# Patient Record
Sex: MALE | Race: BLACK OR AFRICAN AMERICAN | NOT HISPANIC OR LATINO | Employment: UNEMPLOYED | URBAN - METROPOLITAN AREA
[De-identification: names, ages, dates, MRNs, and addresses within clinical notes are randomized per-mention and may not be internally consistent; named-entity substitution may affect disease eponyms.]

---

## 2020-05-18 ENCOUNTER — HOSPITAL ENCOUNTER (EMERGENCY)
Facility: CLINIC | Age: 85
End: 2020-05-18
Payer: MEDICARE

## 2022-12-06 ENCOUNTER — APPOINTMENT (OUTPATIENT)
Dept: GENERAL RADIOLOGY | Facility: CLINIC | Age: 87
End: 2022-12-06
Attending: EMERGENCY MEDICINE
Payer: MEDICARE

## 2022-12-06 ENCOUNTER — HOSPITAL ENCOUNTER (EMERGENCY)
Facility: CLINIC | Age: 87
Discharge: HOME | End: 2022-12-06
Attending: EMERGENCY MEDICINE | Admitting: EMERGENCY MEDICINE
Payer: MEDICARE

## 2022-12-06 VITALS
RESPIRATION RATE: 16 BRPM | HEART RATE: 88 BPM | OXYGEN SATURATION: 100 % | DIASTOLIC BLOOD PRESSURE: 81 MMHG | SYSTOLIC BLOOD PRESSURE: 155 MMHG | TEMPERATURE: 97.9 F

## 2022-12-06 DIAGNOSIS — J10.1 INFLUENZA A: ICD-10-CM

## 2022-12-06 LAB
ANION GAP SERPL CALCULATED.3IONS-SCNC: 4 MMOL/L (ref 3–14)
BASOPHILS # BLD AUTO: 0.1 10E3/UL (ref 0–0.2)
BASOPHILS NFR BLD AUTO: 1 %
BUN SERPL-MCNC: 17 MG/DL (ref 7–30)
CALCIUM SERPL-MCNC: 9.6 MG/DL (ref 8.5–10.1)
CHLORIDE BLD-SCNC: 102 MMOL/L (ref 94–109)
CO2 SERPL-SCNC: 28 MMOL/L (ref 20–32)
CREAT SERPL-MCNC: 1.21 MG/DL (ref 0.66–1.25)
EOSINOPHIL # BLD AUTO: 0.9 10E3/UL (ref 0–0.7)
EOSINOPHIL NFR BLD AUTO: 14 %
ERYTHROCYTE [DISTWIDTH] IN BLOOD BY AUTOMATED COUNT: 14.2 % (ref 10–15)
FLUAV RNA SPEC QL NAA+PROBE: POSITIVE
FLUBV RNA RESP QL NAA+PROBE: NEGATIVE
GFR SERPL CREATININE-BSD FRML MDRD: 56 ML/MIN/1.73M2
GLUCOSE BLD-MCNC: 109 MG/DL (ref 70–99)
HCT VFR BLD AUTO: 39.6 % (ref 40–53)
HGB BLD-MCNC: 13 G/DL (ref 13.3–17.7)
IMM GRANULOCYTES # BLD: 0 10E3/UL
IMM GRANULOCYTES NFR BLD: 0 %
LACTATE SERPL-SCNC: 1.4 MMOL/L (ref 0.7–2)
LYMPHOCYTES # BLD AUTO: 1.5 10E3/UL (ref 0.8–5.3)
LYMPHOCYTES NFR BLD AUTO: 23 %
MCH RBC QN AUTO: 26.9 PG (ref 26.5–33)
MCHC RBC AUTO-ENTMCNC: 32.8 G/DL (ref 31.5–36.5)
MCV RBC AUTO: 82 FL (ref 78–100)
MONOCYTES # BLD AUTO: 0.4 10E3/UL (ref 0–1.3)
MONOCYTES NFR BLD AUTO: 6 %
NEUTROPHILS # BLD AUTO: 3.7 10E3/UL (ref 1.6–8.3)
NEUTROPHILS NFR BLD AUTO: 56 %
NRBC # BLD AUTO: 0 10E3/UL
NRBC BLD AUTO-RTO: 0 /100
PLATELET # BLD AUTO: 184 10E3/UL (ref 150–450)
POTASSIUM BLD-SCNC: 4.3 MMOL/L (ref 3.4–5.3)
PROCALCITONIN SERPL-MCNC: <0.05 NG/ML
RBC # BLD AUTO: 4.84 10E6/UL (ref 4.4–5.9)
RSV RNA SPEC NAA+PROBE: NEGATIVE
SARS-COV-2 RNA RESP QL NAA+PROBE: NEGATIVE
SODIUM SERPL-SCNC: 134 MMOL/L (ref 133–144)
WBC # BLD AUTO: 6.5 10E3/UL (ref 4–11)

## 2022-12-06 PROCEDURE — 83605 ASSAY OF LACTIC ACID: CPT | Performed by: EMERGENCY MEDICINE

## 2022-12-06 PROCEDURE — 85025 COMPLETE CBC W/AUTO DIFF WBC: CPT | Performed by: EMERGENCY MEDICINE

## 2022-12-06 PROCEDURE — 84145 PROCALCITONIN (PCT): CPT | Performed by: EMERGENCY MEDICINE

## 2022-12-06 PROCEDURE — 71046 X-RAY EXAM CHEST 2 VIEWS: CPT

## 2022-12-06 PROCEDURE — 250N000013 HC RX MED GY IP 250 OP 250 PS 637: Performed by: EMERGENCY MEDICINE

## 2022-12-06 PROCEDURE — 999N000127 HC STATISTIC PERIPHERAL IV START W US GUIDANCE

## 2022-12-06 PROCEDURE — 99284 EMERGENCY DEPT VISIT MOD MDM: CPT | Mod: CS | Performed by: EMERGENCY MEDICINE

## 2022-12-06 PROCEDURE — 80048 BASIC METABOLIC PNL TOTAL CA: CPT | Performed by: EMERGENCY MEDICINE

## 2022-12-06 PROCEDURE — 99284 EMERGENCY DEPT VISIT MOD MDM: CPT | Mod: CS,25 | Performed by: EMERGENCY MEDICINE

## 2022-12-06 PROCEDURE — 258N000003 HC RX IP 258 OP 636: Performed by: EMERGENCY MEDICINE

## 2022-12-06 PROCEDURE — 36415 COLL VENOUS BLD VENIPUNCTURE: CPT | Performed by: EMERGENCY MEDICINE

## 2022-12-06 PROCEDURE — C9803 HOPD COVID-19 SPEC COLLECT: HCPCS | Performed by: EMERGENCY MEDICINE

## 2022-12-06 PROCEDURE — 87637 SARSCOV2&INF A&B&RSV AMP PRB: CPT | Performed by: EMERGENCY MEDICINE

## 2022-12-06 RX ORDER — ACETAMINOPHEN 500 MG
500 TABLET ORAL ONCE
Status: COMPLETED | OUTPATIENT
Start: 2022-12-06 | End: 2022-12-06

## 2022-12-06 RX ORDER — OSELTAMIVIR PHOSPHATE 75 MG/1
75 CAPSULE ORAL ONCE
Status: COMPLETED | OUTPATIENT
Start: 2022-12-06 | End: 2022-12-06

## 2022-12-06 RX ORDER — OSELTAMIVIR PHOSPHATE 75 MG/1
75 CAPSULE ORAL 2 TIMES DAILY
Qty: 10 CAPSULE | Refills: 0 | Status: SHIPPED | OUTPATIENT
Start: 2022-12-06 | End: 2022-12-11

## 2022-12-06 RX ADMIN — ACETAMINOPHEN 500 MG: 500 TABLET ORAL at 09:23

## 2022-12-06 RX ADMIN — SODIUM CHLORIDE 500 ML: 9 INJECTION, SOLUTION INTRAVENOUS at 12:27

## 2022-12-06 RX ADMIN — OSELTAMIVIR PHOSPHATE 75 MG: 75 CAPSULE ORAL at 09:23

## 2022-12-06 ASSESSMENT — ACTIVITIES OF DAILY LIVING (ADL)
ADLS_ACUITY_SCORE: 35

## 2022-12-06 NOTE — ED TRIAGE NOTES
Triage Assessment     Row Name 12/06/22 0743       Triage Assessment (Adult)    Airway WDL WDL       Respiratory WDL    Respiratory WDL X;cough    Cough Frequency frequent    Cough Type dry       Skin Circulation/Temperature WDL    Skin Circulation/Temperature WDL WDL       Cardiac WDL    Cardiac WDL WDL       Peripheral/Neurovascular WDL    Peripheral Neurovascular WDL WDL       Cognitive/Neuro/Behavioral WDL    Cognitive/Neuro/Behavioral WDL WDL

## 2022-12-08 ASSESSMENT — ENCOUNTER SYMPTOMS
ABDOMINAL PAIN: 0
SHORTNESS OF BREATH: 0
FEVER: 0

## 2022-12-08 NOTE — ED PROVIDER NOTES
ED Provider Note  [facility]      History     Chief Complaint   Patient presents with     Flu Symptoms     Body aches, fevers, cough     HPI  Iván Hernandez is a 89 year old male who comes in with family with complaints of generalized myalgias, fevers and dry cough for the last 24 hours.  Patient also reports generalized weakness and mildly decreased appetite.  He denies any chest pain or shortness of breath, no abdominal pain no urinary frequency or dysuria.  Family reports sick contacts in the household.    Past Medical History  Past Medical History:   Diagnosis Date     Hypertension goal BP (blood pressure) < 140/90 10/28/2014     History reviewed. No pertinent surgical history.  oseltamivir (TAMIFLU) 75 MG capsule  atenolol (TENORMIN) 50 MG tablet  hydrochlorothiazide (HYDRODIURIL) 50 MG tablet      Allergies   Allergen Reactions     Lisinopril      Cough and headache      Family History  No family history on file.  Social History   Social History     Tobacco Use     Smoking status: Never   Substance Use Topics     Alcohol use: No     Drug use: No      Past medical history, past surgical history, medications, allergies, family history, and social history were reviewed with the patient. No additional pertinent items.       Review of Systems   Constitutional: Negative for fever.   Respiratory: Negative for shortness of breath.    Cardiovascular: Negative for chest pain.   Gastrointestinal: Negative for abdominal pain.   All other systems reviewed and are negative.    A complete review of systems was performed with pertinent positives and negatives noted in the HPI, and all other systems negative.    Physical Exam   BP: (!) 145/69  Pulse: 75  Temp: 97.9  F (36.6  C)  Resp: 16  SpO2: 98 %  Physical Exam  Vitals and nursing note reviewed.   Constitutional:       General: He is not in acute distress.     Appearance: Normal appearance. He is not diaphoretic.   HENT:      Head:  Atraumatic.      Mouth/Throat:      Mouth: Oropharynx is clear and moist.   Eyes:      General: No scleral icterus.     Pupils: Pupils are equal, round, and reactive to light.   Cardiovascular:      Rate and Rhythm: Normal rate and regular rhythm.      Pulses: Intact distal pulses.      Heart sounds: Normal heart sounds.   Pulmonary:      Effort: No respiratory distress.      Breath sounds: Normal breath sounds.   Abdominal:      General: Bowel sounds are normal.      Palpations: Abdomen is soft.      Tenderness: There is no abdominal tenderness.   Musculoskeletal:         General: No tenderness or edema.   Skin:     General: Skin is warm.      Findings: No rash.   Neurological:      General: No focal deficit present.      Mental Status: He is alert and oriented to person, place, and time.   Psychiatric:         Mood and Affect: Mood normal.         Behavior: Behavior normal.           ED Course      Procedures                   Results for orders placed or performed during the hospital encounter of 12/06/22   XR Chest 2 Views     Status: None    Narrative    CHEST 2 VIEWS   12/6/2022 9:41 AM     HISTORY: Cough (flu+).    COMPARISON: CT chest 10/13/2008.      Impression    IMPRESSION: Ill-defined bibasilar opacities. Pneumonia is a  possibility. Stable mildly enlarged cardiac silhouette.    SHRUTI RAGSDALE MD         SYSTEM ID:  [id number]   Symptomatic Influenza A/B & SARS-CoV2 (COVID-19) Virus PCR Multiplex Nasopharyngeal     Status: Abnormal    Specimen: Nasopharyngeal; Swab   Result Value Ref Range    Influenza A PCR Positive (A) Negative    Influenza B PCR Negative Negative    RSV PCR Negative Negative    SARS CoV2 PCR Negative Negative    Narrative    Testing was performed using the Xpert Xpress CoV2/Flu/RSV Assay on the [employer] GeneXpert Instrument. This test should be ordered for the detection of SARS-CoV-2 and influenza viruses in individuals who meet clinical and/or epidemiological criteria. Test performance is  unknown in asymptomatic patients. This test is for in vitro diagnostic use under the FDA EUA for laboratories certified under CLIA to perform high or moderate complexity testing. This test has not been FDA cleared or approved. A negative result does not rule out the presence of PCR inhibitors in the specimen or target RNA in concentration below the limit of detection for the assay. If only one viral target is positive but coinfection with multiple targets is suspected, the sample should be re-tested with another FDA cleared, approved, or authorized test, if coinfection would change clinical management. This test was validated by the [email or web address]. These laboratories are certified under the Clinical Laboratory Improvement Amendments of 1988 (CLIA-88) as qualified to perform high complexity laboratory testing.   Basic metabolic panel     Status: Abnormal   Result Value Ref Range    Sodium 134 133 - 144 mmol/L    Potassium 4.3 3.4 - 5.3 mmol/L    Chloride 102 94 - 109 mmol/L    Carbon Dioxide (CO2) 28 20 - 32 mmol/L    Anion Gap 4 3 - 14 mmol/L    Urea Nitrogen 17 7 - 30 mg/dL    Creatinine 1.21 0.66 - 1.25 mg/dL    Calcium 9.6 8.5 - 10.1 mg/dL    Glucose 109 (H) 70 - 99 mg/dL    GFR Estimate 56 (L) >60 mL/min/1.73m2   Lactic acid whole blood     Status: Normal   Result Value Ref Range    Lactic Acid 1.4 0.7 - 2.0 mmol/L   Procalcitonin     Status: Normal   Result Value Ref Range    Procalcitonin <0.05 <0.05 ng/mL   CBC with platelets and differential     Status: Abnormal   Result Value Ref Range    WBC Count 6.5 4.0 - 11.0 10e3/uL    RBC Count 4.84 4.40 - 5.90 10e6/uL    Hemoglobin 13.0 (L) 13.3 - 17.7 g/dL    Hematocrit 39.6 (L) 40.0 - 53.0 %    MCV 82 78 - 100 fL    MCH 26.9 26.5 - 33.0 pg    MCHC 32.8 31.5 - 36.5 g/dL    RDW 14.2 10.0 - 15.0 %    Platelet Count 184 150 - 450 10e3/uL    % Neutrophils 56 %    % Lymphocytes 23 %    % Monocytes 6 %    % Eosinophils 14 %    % Basophils 1 %    % Immature  Granulocytes 0 %    NRBCs per 100 WBC 0 <1 /100    Absolute Neutrophils 3.7 1.6 - 8.3 10e3/uL    Absolute Lymphocytes 1.5 0.8 - 5.3 10e3/uL    Absolute Monocytes 0.4 0.0 - 1.3 10e3/uL    Absolute Eosinophils 0.9 (H) 0.0 - 0.7 10e3/uL    Absolute Basophils 0.1 0.0 - 0.2 10e3/uL    Absolute Immature Granulocytes 0.0 <=0.4 10e3/uL    Absolute NRBCs 0.0 10e3/uL   CBC with platelets differential     Status: Abnormal    Narrative    The following orders were created for panel order CBC with platelets differential.  Procedure                               Abnormality         Status                     ---------                               -----------         ------                     CBC with platelets and d...[[id number]]  Abnormal            Final result                 Please view results for these tests on the individual orders.     Medications   acetaminophen (TYLENOL) tablet 500 mg (500 mg Oral Given 12/6/22 0923)   oseltamivir (TAMIFLU) capsule 75 mg (75 mg Oral Given 12/6/22 0923)   0.9% sodium chloride BOLUS (0 mLs Intravenous Stopped 12/6/22 1230)        Assessments & Plan (with Medical Decision Making)     89 year old male who comes in with family with complaints of generalized myalgias, fevers and dry cough for the last 24 hours.  Vital signs stable and afebrile in triage including normal pulse ox and 98% on room air.  IV established, labs drawn sent reviewed document epic remarkable for positive influenza A swab but otherwise normal CBC electrolytes.  Procalcitonin undetectable.  Chest x-ray was obtained interpreted by me which revealed bibasilar opacities consistent with viral infection.  Patient given 1 L IV fluid bolus here in the department along with 500 mg of acetaminophen and Tamiflu here in Emergency Department and prescription.  Patient tolerating p.o. without any difficulty here in the emergency department.  Plan for discharge home with prescription for Tamiflu and follow-up with primary care  provider within 5 days for further evaluation and care.  Patient family expressed verbal standing and anticipatory guidance return precautions to the emergency department.    I have reviewed the nursing notes. I have reviewed the findings, diagnosis, plan and need for follow up with the patient.    Discharge Medication List as of 12/6/2022  2:15 PM      START taking these medications    Details   oseltamivir (TAMIFLU) 75 MG capsule Take 1 capsule (75 mg) by mouth 2 times daily for 5 days, Disp-10 capsule, R-0, Local Print             Final diagnoses:   Influenza A       --  Kimberley Mcmillan MD  [facility] EMERGENCY DEPARTMENT  12/6/2022     Kimberley Mcmillan MD  12/08/22 0050

## 2024-01-01 ENCOUNTER — APPOINTMENT (OUTPATIENT)
Dept: GENERAL RADIOLOGY | Facility: CLINIC | Age: 89
End: 2024-01-01
Attending: FAMILY MEDICINE
Payer: COMMERCIAL

## 2024-01-01 ENCOUNTER — TRANSITIONAL CARE UNIT VISIT (OUTPATIENT)
Dept: GERIATRICS | Facility: CLINIC | Age: 89
End: 2024-01-01
Payer: COMMERCIAL

## 2024-01-01 ENCOUNTER — DOCUMENTATION ONLY (OUTPATIENT)
Dept: GERIATRICS | Facility: CLINIC | Age: 89
End: 2024-01-01
Payer: COMMERCIAL

## 2024-01-01 ENCOUNTER — APPOINTMENT (OUTPATIENT)
Dept: CT IMAGING | Facility: CLINIC | Age: 89
End: 2024-01-01
Attending: FAMILY MEDICINE
Payer: COMMERCIAL

## 2024-01-01 ENCOUNTER — TELEPHONE (OUTPATIENT)
Dept: GERIATRICS | Facility: CLINIC | Age: 89
End: 2024-01-01
Payer: COMMERCIAL

## 2024-01-01 ENCOUNTER — HOSPITAL ENCOUNTER (EMERGENCY)
Facility: CLINIC | Age: 89
Discharge: HOME | End: 2024-05-27
Attending: FAMILY MEDICINE | Admitting: FAMILY MEDICINE
Payer: COMMERCIAL

## 2024-01-01 VITALS
DIASTOLIC BLOOD PRESSURE: 67 MMHG | OXYGEN SATURATION: 99 % | HEIGHT: 72 IN | HEART RATE: 80 BPM | SYSTOLIC BLOOD PRESSURE: 114 MMHG | WEIGHT: 173 LBS | RESPIRATION RATE: 17 BRPM | BODY MASS INDEX: 23.43 KG/M2 | TEMPERATURE: 98.5 F

## 2024-01-01 VITALS
RESPIRATION RATE: 18 BRPM | HEART RATE: 99 BPM | OXYGEN SATURATION: 94 % | DIASTOLIC BLOOD PRESSURE: 70 MMHG | SYSTOLIC BLOOD PRESSURE: 130 MMHG | BODY MASS INDEX: 23.06 KG/M2 | TEMPERATURE: 98.2 F | WEIGHT: 170 LBS

## 2024-01-01 DIAGNOSIS — N12 PYELONEPHRITIS: ICD-10-CM

## 2024-01-01 DIAGNOSIS — I50.31 ACUTE DIASTOLIC HEART FAILURE (H): ICD-10-CM

## 2024-01-01 DIAGNOSIS — I69.954 HEMIPLEGIA AND HEMIPARESIS FOLLOWING UNSPECIFIED CEREBROVASCULAR DISEASE AFFECTING LEFT NON-DOMINANT SIDE (H): ICD-10-CM

## 2024-01-01 DIAGNOSIS — G40.909 SEIZURE DISORDER (H): ICD-10-CM

## 2024-01-01 DIAGNOSIS — R10.32 ABDOMINAL PAIN, LEFT LOWER QUADRANT: ICD-10-CM

## 2024-01-01 DIAGNOSIS — R53.81 PHYSICAL DECONDITIONING: ICD-10-CM

## 2024-01-01 DIAGNOSIS — I42.9 CARDIOMYOPATHY, UNSPECIFIED TYPE (H): ICD-10-CM

## 2024-01-01 DIAGNOSIS — K59.00 CONSTIPATION, UNSPECIFIED CONSTIPATION TYPE: ICD-10-CM

## 2024-01-01 DIAGNOSIS — J18.9 COMMUNITY ACQUIRED PNEUMONIA, UNSPECIFIED LATERALITY: Primary | ICD-10-CM

## 2024-01-01 DIAGNOSIS — N18.30 STAGE 3 CHRONIC KIDNEY DISEASE, UNSPECIFIED WHETHER STAGE 3A OR 3B CKD (H): ICD-10-CM

## 2024-01-01 DIAGNOSIS — I21.4 NSTEMI (NON-ST ELEVATED MYOCARDIAL INFARCTION) (H): ICD-10-CM

## 2024-01-01 LAB
ALBUMIN SERPL BCG-MCNC: 3.4 G/DL (ref 3.5–5.2)
ALBUMIN UR-MCNC: 30 MG/DL
ALP SERPL-CCNC: 77 U/L (ref 40–150)
ALT SERPL W P-5'-P-CCNC: 9 U/L (ref 0–70)
ANION GAP SERPL CALCULATED.3IONS-SCNC: 11 MMOL/L (ref 7–15)
APPEARANCE UR: CLEAR
AST SERPL W P-5'-P-CCNC: 16 U/L (ref 0–45)
ATRIAL RATE - MUSE: 76 BPM
BASOPHILS # BLD AUTO: 0.1 10E3/UL (ref 0–0.2)
BASOPHILS NFR BLD AUTO: 1 %
BILIRUB SERPL-MCNC: 0.4 MG/DL
BILIRUB UR QL STRIP: NEGATIVE
BUN SERPL-MCNC: 25.1 MG/DL (ref 8–23)
CALCIUM SERPL-MCNC: 9.5 MG/DL (ref 8.2–9.6)
CHLORIDE SERPL-SCNC: 100 MMOL/L (ref 98–107)
COLOR UR AUTO: ABNORMAL
CREAT SERPL-MCNC: 1.54 MG/DL (ref 0.67–1.17)
DEPRECATED HCO3 PLAS-SCNC: 23 MMOL/L (ref 22–29)
DIASTOLIC BLOOD PRESSURE - MUSE: NORMAL MMHG
EGFRCR SERPLBLD CKD-EPI 2021: 42 ML/MIN/1.73M2
EOSINOPHIL # BLD AUTO: 0.6 10E3/UL (ref 0–0.7)
EOSINOPHIL NFR BLD AUTO: 9 %
ERYTHROCYTE [DISTWIDTH] IN BLOOD BY AUTOMATED COUNT: 14.1 % (ref 10–15)
GLUCOSE SERPL-MCNC: 145 MG/DL (ref 70–99)
GLUCOSE UR STRIP-MCNC: NEGATIVE MG/DL
HCT VFR BLD AUTO: 32.8 % (ref 40–53)
HGB BLD-MCNC: 10.8 G/DL (ref 13.3–17.7)
HGB UR QL STRIP: NEGATIVE
IMM GRANULOCYTES # BLD: 0 10E3/UL
IMM GRANULOCYTES NFR BLD: 1 %
INTERPRETATION ECG - MUSE: NORMAL
KETONES UR STRIP-MCNC: NEGATIVE MG/DL
LEUKOCYTE ESTERASE UR QL STRIP: NEGATIVE
LIPASE SERPL-CCNC: 26 U/L (ref 13–60)
LYMPHOCYTES # BLD AUTO: 1.1 10E3/UL (ref 0.8–5.3)
LYMPHOCYTES NFR BLD AUTO: 17 %
MAGNESIUM SERPL-MCNC: 2 MG/DL (ref 1.7–2.3)
MCH RBC QN AUTO: 26.8 PG (ref 26.5–33)
MCHC RBC AUTO-ENTMCNC: 32.9 G/DL (ref 31.5–36.5)
MCV RBC AUTO: 81 FL (ref 78–100)
MONOCYTES # BLD AUTO: 0.3 10E3/UL (ref 0–1.3)
MONOCYTES NFR BLD AUTO: 5 %
MUCOUS THREADS #/AREA URNS LPF: PRESENT /LPF
NEUTROPHILS # BLD AUTO: 4.1 10E3/UL (ref 1.6–8.3)
NEUTROPHILS NFR BLD AUTO: 67 %
NITRATE UR QL: NEGATIVE
NRBC # BLD AUTO: 0 10E3/UL
NRBC BLD AUTO-RTO: 0 /100
P AXIS - MUSE: 41 DEGREES
PH UR STRIP: 6 [PH] (ref 5–7)
PLATELET # BLD AUTO: 291 10E3/UL (ref 150–450)
POTASSIUM SERPL-SCNC: 4.1 MMOL/L (ref 3.4–5.3)
PR INTERVAL - MUSE: 240 MS
PROT SERPL-MCNC: 6.6 G/DL (ref 6.4–8.3)
QRS DURATION - MUSE: 148 MS
QT - MUSE: 420 MS
QTC - MUSE: 472 MS
R AXIS - MUSE: 86 DEGREES
RBC # BLD AUTO: 4.03 10E6/UL (ref 4.4–5.9)
RBC URINE: 2 /HPF
SODIUM SERPL-SCNC: 134 MMOL/L (ref 135–145)
SP GR UR STRIP: 1.02 (ref 1–1.03)
SQUAMOUS EPITHELIAL: <1 /HPF
SYSTOLIC BLOOD PRESSURE - MUSE: NORMAL MMHG
T AXIS - MUSE: -13 DEGREES
TROPONIN T SERPL HS-MCNC: 39 NG/L
TROPONIN T SERPL HS-MCNC: 42 NG/L
TSH SERPL DL<=0.005 MIU/L-ACNC: 1.87 UIU/ML (ref 0.3–4.2)
UROBILINOGEN UR STRIP-MCNC: NORMAL MG/DL
VENTRICULAR RATE- MUSE: 76 BPM
WBC # BLD AUTO: 6.1 10E3/UL (ref 4–11)
WBC URINE: 1 /HPF

## 2024-01-01 PROCEDURE — 74176 CT ABD & PELVIS W/O CONTRAST: CPT

## 2024-01-01 PROCEDURE — 80053 COMPREHEN METABOLIC PANEL: CPT | Performed by: FAMILY MEDICINE

## 2024-01-01 PROCEDURE — 83735 ASSAY OF MAGNESIUM: CPT | Performed by: FAMILY MEDICINE

## 2024-01-01 PROCEDURE — 81001 URINALYSIS AUTO W/SCOPE: CPT | Performed by: FAMILY MEDICINE

## 2024-01-01 PROCEDURE — 93005 ELECTROCARDIOGRAM TRACING: CPT | Performed by: FAMILY MEDICINE

## 2024-01-01 PROCEDURE — 84443 ASSAY THYROID STIM HORMONE: CPT | Performed by: FAMILY MEDICINE

## 2024-01-01 PROCEDURE — 84484 ASSAY OF TROPONIN QUANT: CPT | Performed by: FAMILY MEDICINE

## 2024-01-01 PROCEDURE — 85004 AUTOMATED DIFF WBC COUNT: CPT | Performed by: FAMILY MEDICINE

## 2024-01-01 PROCEDURE — 99285 EMERGENCY DEPT VISIT HI MDM: CPT | Mod: 25 | Performed by: FAMILY MEDICINE

## 2024-01-01 PROCEDURE — 83690 ASSAY OF LIPASE: CPT | Performed by: FAMILY MEDICINE

## 2024-01-01 PROCEDURE — 93010 ELECTROCARDIOGRAM REPORT: CPT | Performed by: FAMILY MEDICINE

## 2024-01-01 PROCEDURE — 36415 COLL VENOUS BLD VENIPUNCTURE: CPT | Performed by: FAMILY MEDICINE

## 2024-01-01 PROCEDURE — 99309 SBSQ NF CARE MODERATE MDM 30: CPT | Performed by: NURSE PRACTITIONER

## 2024-01-01 PROCEDURE — 71046 X-RAY EXAM CHEST 2 VIEWS: CPT

## 2024-01-01 PROCEDURE — 99285 EMERGENCY DEPT VISIT HI MDM: CPT | Performed by: FAMILY MEDICINE

## 2024-01-01 RX ORDER — LEVETIRACETAM 250 MG/1
500 TABLET ORAL 2 TIMES DAILY
COMMUNITY

## 2024-01-01 RX ORDER — FELODIPINE 5 MG/1
5 TABLET, EXTENDED RELEASE ORAL DAILY
COMMUNITY

## 2024-01-01 RX ORDER — BISACODYL 10 MG
10 SUPPOSITORY, RECTAL RECTAL DAILY PRN
Qty: 2 SUPPOSITORY | Refills: 0 | Status: SHIPPED | OUTPATIENT
Start: 2024-01-01 | End: 2024-01-01

## 2024-01-01 RX ORDER — ASPIRIN 81 MG/1
TABLET ORAL
COMMUNITY

## 2024-01-01 RX ORDER — FINASTERIDE 5 MG/1
5 TABLET, FILM COATED ORAL DAILY
COMMUNITY

## 2024-01-01 RX ORDER — ACETAMINOPHEN 500 MG
1000 TABLET ORAL 3 TIMES DAILY PRN
COMMUNITY

## 2024-01-01 RX ORDER — SENNOSIDES 8.6 MG
1 TABLET ORAL 2 TIMES DAILY PRN
COMMUNITY

## 2024-01-01 RX ORDER — ATORVASTATIN CALCIUM 20 MG/1
20 TABLET, FILM COATED ORAL DAILY
COMMUNITY

## 2024-01-01 RX ORDER — AMOXICILLIN 250 MG
1 CAPSULE ORAL 2 TIMES DAILY PRN
Qty: 30 TABLET | Refills: 0 | Status: SHIPPED | OUTPATIENT
Start: 2024-01-01 | End: 2024-01-01

## 2024-01-01 RX ORDER — TAMSULOSIN HYDROCHLORIDE 0.4 MG/1
0.4 CAPSULE ORAL DAILY
COMMUNITY

## 2024-01-01 RX ORDER — POLYETHYLENE GLYCOL 3350 17 G/17G
17 POWDER, FOR SOLUTION ORAL DAILY
COMMUNITY

## 2024-01-01 RX ORDER — CEFUROXIME AXETIL 500 MG/1
500 TABLET ORAL 2 TIMES DAILY
COMMUNITY

## 2024-01-01 ASSESSMENT — ACTIVITIES OF DAILY LIVING (ADL)
ADLS_ACUITY_SCORE: 35

## 2024-01-01 ASSESSMENT — COLUMBIA-SUICIDE SEVERITY RATING SCALE - C-SSRS
6. HAVE YOU EVER DONE ANYTHING, STARTED TO DO ANYTHING, OR PREPARED TO DO ANYTHING TO END YOUR LIFE?: NO
1. IN THE PAST MONTH, HAVE YOU WISHED YOU WERE DEAD OR WISHED YOU COULD GO TO SLEEP AND NOT WAKE UP?: NO
2. HAVE YOU ACTUALLY HAD ANY THOUGHTS OF KILLING YOURSELF IN THE PAST MONTH?: NO

## 2024-05-27 NOTE — DISCHARGE INSTRUCTIONS
Thank you for choosing [facility].     Please closely monitor for further symptoms. Return to the Emergency Department if you develop any new or worsening signs or symptoms.    If you received any opiate pain medications or sedatives during your visit, please do not drive for at least 8 hours.     Labs, cultures or final xray interpretations may still need to be reviewed.  We will call you if your plan of care needs to be changed.    Drink more water.  Today your kidney function was very slightly down when compared to previous which may be related to dehydration, aging, or medications.  Please follow up with your primary care physician or clinic in the next 7 to 10 days to recheck and discuss any further intervention.

## 2024-05-27 NOTE — ED TRIAGE NOTES
Pt presents to the ED for concerns of generalized weakness and his whole body hurting. Pt states this has been going on for the past 3-4 days. Pt denies any chest pain, shortness of breath. Son, does state pt has had a cough, intermittently.      Triage Assessment (Adult)       Row Name 05/27/24 0735          Triage Assessment    Airway WDL WDL        Respiratory WDL    Respiratory WDL WDL        Skin Circulation/Temperature WDL    Skin Circulation/Temperature WDL WDL        Cardiac WDL    Cardiac WDL WDL        Peripheral/Neurovascular WDL    Peripheral Neurovascular WDL WDL        Cognitive/Neuro/Behavioral WDL    Cognitive/Neuro/Behavioral WDL WDL

## 2024-05-27 NOTE — ED PROVIDER NOTES
ED Provider Note  [facility]      History     Chief Complaint   Patient presents with    Generalized Body Aches     Ongoing for the past 3-4 days     Generalized Weakness     HPI  Iván Hernandez is a 89 year old male who has history of CVA, seizure disorder, hypertension, benign prostatic hypertrophy, hyperlipidemia, aortic stenosis, who is presenting complaining of generalized bodyaches, no bowel movement for 6 days, lower abdominal pain, increased difficulty voiding including hesitancy and frequency but no dysuria.  Denies fever, chills, chest pain or shortness of breath.  He has chronic lower extremity edema which he states is at baseline.  Has not suffered any trauma.  He has no neurologic symptoms.    Past Medical History  Past Medical History:   Diagnosis Date    Hypertension goal BP (blood pressure) < 140/90 10/28/2014     History reviewed. No pertinent surgical history.  bisacodyl (DULCOLAX) 10 MG suppository  magnesium hydroxide (MILK OF MAGNESIA) 400 MG/5ML suspension  senna-docusate (SENOKOT-S/PERICOLACE) 8.6-50 MG tablet  aspirin 81 MG EC tablet  atenolol (TENORMIN) 50 MG tablet  hydrochlorothiazide (HYDRODIURIL) 50 MG tablet  levETIRAcetam (KEPPRA) 250 MG tablet      Allergies   Allergen Reactions    Lisinopril      Cough and headache      Family History  History reviewed. No pertinent family history.  Social History   Social History     Tobacco Use    Smoking status: Never   Substance Use Topics    Alcohol use: No    Drug use: No      A medically appropriate review of systems was performed with pertinent positives and negatives noted in the HPI, and all other systems negative.    Physical Exam   BP: 112/66  Pulse: 82  Temp: 98.5  F (36.9  C)  Resp: 16  Height: 182.9 cm (6')  Weight: 78.5 kg (173 lb)  SpO2: 98 %  Physical Exam  Vitals and nursing note reviewed.   Constitutional:       General: He is not in acute distress.     Appearance: Normal appearance. He is not  toxic-appearing.   HENT:      Head: Atraumatic.   Eyes:      General: No scleral icterus.     Conjunctiva/sclera: Conjunctivae normal.   Cardiovascular:      Rate and Rhythm: Normal rate and regular rhythm.      Heart sounds: Murmur (Loud murmur over the aortic site) heard.   Pulmonary:      Effort: Pulmonary effort is normal. No respiratory distress.      Breath sounds: Normal breath sounds.   Abdominal:      Palpations: Abdomen is soft.      Tenderness: There is no abdominal tenderness. There is no left CVA tenderness, guarding or rebound.   Genitourinary:     Penis: Normal.       Testes: Normal.   Musculoskeletal:         General: No deformity.      Cervical back: Neck supple.      Right lower leg: Edema present.      Left lower leg: Edema (1+ bilateral symmetric nontender edema) present.   Skin:     General: Skin is warm.   Neurological:      General: No focal deficit present.      Mental Status: He is alert and oriented to person, place, and time.           ED Course, Procedures, & Data      Procedures            EKG Interpretation:      Interpreted by Varnu Umaña MD  Time reviewed: 0804  Symptoms at time of EKG: Generalized myalgias0   Rhythm: normal sinus   Rate: Normal  Axis: Normal  Ectopy: none  Conduction: right bundle branch block (complete) and 1st degree AV block  ST Segments/ T Waves: T wave inversion Lateral and Inferior  Q Waves: nonspecific  Comparison to prior: Unchanged from January 2021    Clinical Impression: Normal sinus rhythm with first-degree AV block, right bundle branch block, inferolateral T wave abnormality but no acute changes when compared to January 2021                  Results for orders placed or performed during the hospital encounter of 05/27/24   Comprehensive metabolic panel     Status: Abnormal   Result Value Ref Range    Sodium 134 (L) 135 - 145 mmol/L    Potassium 4.1 3.4 - 5.3 mmol/L    Carbon Dioxide (CO2) 23 22 - 29 mmol/L    Anion Gap 11 7 - 15 mmol/L    Urea  Nitrogen 25.1 (H) 8.0 - 23.0 mg/dL    Creatinine 1.54 (H) 0.67 - 1.17 mg/dL    GFR Estimate 42 (L) >60 mL/min/1.73m2    Calcium 9.5 8.2 - 9.6 mg/dL    Chloride 100 98 - 107 mmol/L    Glucose 145 (H) 70 - 99 mg/dL    Alkaline Phosphatase 77 40 - 150 U/L    AST 16 0 - 45 U/L    ALT 9 0 - 70 U/L    Protein Total 6.6 6.4 - 8.3 g/dL    Albumin 3.4 (L) 3.5 - 5.2 g/dL    Bilirubin Total 0.4 <=1.2 mg/dL   Magnesium     Status: Normal   Result Value Ref Range    Magnesium 2.0 1.7 - 2.3 mg/dL   TSH with free T4 reflex     Status: Normal   Result Value Ref Range    TSH 1.87 0.30 - 4.20 uIU/mL   UA with Microscopic reflex to Culture     Status: Abnormal    Specimen: Urine, Midstream   Result Value Ref Range    Color Urine Light Yellow Colorless, Straw, Light Yellow, Yellow    Appearance Urine Clear Clear    Glucose Urine Negative Negative mg/dL    Bilirubin Urine Negative Negative    Ketones Urine Negative Negative mg/dL    Specific Gravity Urine 1.016 1.003 - 1.035    Blood Urine Negative Negative    pH Urine 6.0 5.0 - 7.0    Protein Albumin Urine 30 (A) Negative mg/dL    Urobilinogen Urine Normal Normal, 2.0 mg/dL    Nitrite Urine Negative Negative    Leukocyte Esterase Urine Negative Negative    Mucus Urine Present (A) None Seen /LPF    RBC Urine 2 <=2 /HPF    WBC Urine 1 <=5 /HPF    Squamous Epithelials Urine <1 <=1 /HPF    Narrative    Urine Culture not indicated   Lipase     Status: Normal   Result Value Ref Range    Lipase 26 13 - 60 U/L   Troponin T, High Sensitivity     Status: Abnormal   Result Value Ref Range    Troponin T, High Sensitivity 42 (H) <=22 ng/L   CBC with platelets and differential     Status: Abnormal   Result Value Ref Range    WBC Count 6.1 4.0 - 11.0 10e3/uL    RBC Count 4.03 (L) 4.40 - 5.90 10e6/uL    Hemoglobin 10.8 (L) 13.3 - 17.7 g/dL    Hematocrit 32.8 (L) 40.0 - 53.0 %    MCV 81 78 - 100 fL    MCH 26.8 26.5 - 33.0 pg    MCHC 32.9 31.5 - 36.5 g/dL    RDW 14.1 10.0 - 15.0 %    Platelet Count 291  150 - 450 10e3/uL    % Neutrophils 67 %    % Lymphocytes 17 %    % Monocytes 5 %    % Eosinophils 9 %    % Basophils 1 %    % Immature Granulocytes 1 %    NRBCs per 100 WBC 0 <1 /100    Absolute Neutrophils 4.1 1.6 - 8.3 10e3/uL    Absolute Lymphocytes 1.1 0.8 - 5.3 10e3/uL    Absolute Monocytes 0.3 0.0 - 1.3 10e3/uL    Absolute Eosinophils 0.6 0.0 - 0.7 10e3/uL    Absolute Basophils 0.1 0.0 - 0.2 10e3/uL    Absolute Immature Granulocytes 0.0 <=0.4 10e3/uL    Absolute NRBCs 0.0 10e3/uL   Troponin T, High Sensitivity     Status: Abnormal   Result Value Ref Range    Troponin T, High Sensitivity 39 (H) <=22 ng/L   EKG 12-lead, tracing only     Status: None   Result Value Ref Range    Systolic Blood Pressure  mmHg    Diastolic Blood Pressure  mmHg    Ventricular Rate 76 BPM    Atrial Rate 76 BPM    WY Interval 240 ms    QRS Duration 148 ms     ms    QTc 472 ms    P Axis 41 degrees    R AXIS 86 degrees    T Axis -13 degrees    Interpretation ECG       Sinus rhythm with 1st degree A-V block  Right bundle branch block  T wave abnormality, consider inferolateral ischemia  Abnormal ECG  Unconfirmed report - interpretation of this ECG is computer generated - see medical record for final interpretation  Confirmed by - EMERGENCY ROOM, PHYSICIAN ([id number]),  VAMSI BAUER ([id number]) on 5/27/2024 8:08:34 AM     CBC with platelets differential     Status: Abnormal    Narrative    The following orders were created for panel order CBC with platelets differential.  Procedure                               Abnormality         Status                     ---------                               -----------         ------                     CBC with platelets and d...[[id number]]  Abnormal            Final result                 Please view results for these tests on the individual orders.     Medications - No data to display  Labs Ordered and Resulted from Time of ED Arrival to Time of ED Departure   COMPREHENSIVE METABOLIC PANEL  - Abnormal       Result Value    Sodium 134 (*)     Potassium 4.1      Carbon Dioxide (CO2) 23      Anion Gap 11      Urea Nitrogen 25.1 (*)     Creatinine 1.54 (*)     GFR Estimate 42 (*)     Calcium 9.5      Chloride 100      Glucose 145 (*)     Alkaline Phosphatase 77      AST 16      ALT 9      Protein Total 6.6      Albumin 3.4 (*)     Bilirubin Total 0.4     ROUTINE UA WITH MICROSCOPIC REFLEX TO CULTURE - Abnormal    Color Urine Light Yellow      Appearance Urine Clear      Glucose Urine Negative      Bilirubin Urine Negative      Ketones Urine Negative      Specific Gravity Urine 1.016      Blood Urine Negative      pH Urine 6.0      Protein Albumin Urine 30 (*)     Urobilinogen Urine Normal      Nitrite Urine Negative      Leukocyte Esterase Urine Negative      Mucus Urine Present (*)     RBC Urine 2      WBC Urine 1      Squamous Epithelials Urine <1     TROPONIN T, HIGH SENSITIVITY - Abnormal    Troponin T, High Sensitivity 42 (*)    CBC WITH PLATELETS AND DIFFERENTIAL - Abnormal    WBC Count 6.1      RBC Count 4.03 (*)     Hemoglobin 10.8 (*)     Hematocrit 32.8 (*)     MCV 81      MCH 26.8      MCHC 32.9      RDW 14.1      Platelet Count 291      % Neutrophils 67      % Lymphocytes 17      % Monocytes 5      % Eosinophils 9      % Basophils 1      % Immature Granulocytes 1      NRBCs per 100 WBC 0      Absolute Neutrophils 4.1      Absolute Lymphocytes 1.1      Absolute Monocytes 0.3      Absolute Eosinophils 0.6      Absolute Basophils 0.1      Absolute Immature Granulocytes 0.0      Absolute NRBCs 0.0     TROPONIN T, HIGH SENSITIVITY - Abnormal    Troponin T, High Sensitivity 39 (*)    MAGNESIUM - Normal    Magnesium 2.0     TSH WITH FREE T4 REFLEX - Normal    TSH 1.87     LIPASE - Normal    Lipase 26       CT Abdomen Pelvis w/o Contrast    (Results Pending)   XR Chest 2 Views    (Results Pending)          Critical care was not performed.     Medical Decision Making  The patient's presentation was of  moderate complexity (an acute illness with systemic symptoms).    The patient's evaluation involved:  review of external note(s) from 2 sources (reviewed notes from prior clinic visit at [facility], and prior admit for extensive medical workup in January 2021)  review of 2 test result(s) ordered prior to this encounter (prior EKG, prior echo results, prior renal function)  ordering and/or review of 3+ test(s) in this encounter (see separate area of note for details)  independent interpretation of testing performed by another health professional (CT abdomen pelvis and chest x-ray images personally reviewed and reviewed radiologist interpretation)    The patient's management necessitated moderate risk (prescription drug management including medications given in the ED) and high risk (a decision regarding hospitalization).    Assessment & Plan    89-year-old male with a history of multiple medical problems including benign prostatic hypertrophy and aortic stenosis who presented with 6 days of diffuse myalgias and weakness, left lower quadrant abdominal pain and decreased bowel movements as well as increased prostate symptoms.  Broad differential diagnosis includes multiple potential causes of left lower quadrant pain associated with myalgias, constipation and urinary symptoms including diverticulitis, AAA, colitis, pancreatitis, small bowel obstruction, neoplasm, UTI, pyelonephritis, ureterolithiasis, prostatitis.  On exam patient has normal vital signs and does not appear to be in any acute distress.  He has a loud murmur systolic obvious at the aortic area consistent with his known history of aortic stenosis.  His cardiovascular exam is otherwise normal.  His lungs are clear he is in no respiratory distress.  Abdomen is moderate tenderness in the left lower quadrant without guarding rebound or peritoneal signs.  His genitourinary exam is normal, he refuses a rectal exam due to cultural concerns.  He has 1+ bilateral  lower extremity edema, nontender which both he and his son tells me is chronic.  EKG without acute changes.  He does have elevation of his troponin however on recheck no change, review of chart has had a persistently elevated troponin in the past.  His symptoms are not consistent with an acute coronary syndrome and I suspect elevated troponin is related to his history of aortic stenosis, mild CHF, and chronic kidney disease.  His GFR is slightly decreased from multiple prior checks, his other labs unremarkable.  Chest x-ray shows atelectasis versus mild pulmonary congestion, compared to previous chest x-ray I do not think represents a significant acute change.  Abdominal CT positive only for large fecal burden and chronic findings.  I offered again to perform a rectal, consider an enema.  The patient refuses.  He will only accept treatment for complex constipation at home.  We discussed his renal function, this is likely acute on chronic related to mild volume depletion.  He prefers to take oral fluids.  Based on the clinical findings and the entire clinical scenario, the patient appears stable at this time to be treated symptomatically, and to follow-up as an outpatient for any further evaluation and treatment.  Discussed expected course, need for follow up, and indications for return with the patient.  See discharge instructions.      I have reviewed the nursing notes. I have reviewed the findings, diagnosis, plan and need for follow up with the patient.    Discharge Medication List as of 5/27/2024 11:23 AM        START taking these medications    Details   bisacodyl (DULCOLAX) 10 MG suppository Place 1 suppository (10 mg) rectally daily as needed for constipation, Disp-2 suppository, R-0, Local Print      magnesium hydroxide (MILK OF MAGNESIA) 400 MG/5ML suspension Take 15 mLs by mouth daily as needed for constipation, Disp-354 mL, R-0, Local Print      senna-docusate (SENOKOT-S/PERICOLACE) 8.6-50 MG tablet Take  1 tablet by mouth 2 times daily as needed for constipation, Disp-30 tablet, R-0, Local Print             Final diagnoses:   Abdominal pain, left lower quadrant   Constipation, unspecified constipation type   Stage 3 chronic kidney disease, unspecified whether stage 3a or 3b CKD (H)       Varun Umaña MD  [facility] EMERGENCY DEPARTMENT  5/27/2024     Varun Umaña MD  05/27/24 0379

## 2024-05-27 NOTE — ED NOTES
Patient does not want to have rectal exam or enema. Provider spoke to patient and son about going home with laxative.

## 2024-06-17 PROBLEM — I63.9 ACUTE CVA (CEREBROVASCULAR ACCIDENT) (H): Status: ACTIVE | Noted: 2019-08-10

## 2024-06-17 PROBLEM — I69.954 HEMIPLEGIA AND HEMIPARESIS FOLLOWING UNSPECIFIED CEREBROVASCULAR DISEASE AFFECTING LEFT NON-DOMINANT SIDE (H): Status: ACTIVE | Noted: 2020-08-06

## 2024-06-17 PROBLEM — N40.1 BENIGN NON-NODULAR PROSTATIC HYPERPLASIA WITH LOWER URINARY TRACT SYMPTOMS: Status: ACTIVE | Noted: 2017-08-01

## 2024-06-17 PROBLEM — J18.9 COMMUNITY ACQUIRED PNEUMONIA OF RIGHT LOWER LOBE OF LUNG: Status: ACTIVE | Noted: 2024-01-01

## 2024-06-17 PROBLEM — N17.9 AKI (ACUTE KIDNEY INJURY) (H): Status: ACTIVE | Noted: 2019-08-10

## 2024-06-17 PROBLEM — I35.0 AORTIC STENOSIS, MODERATE: Status: ACTIVE | Noted: 2024-01-01

## 2024-06-17 PROBLEM — R53.1 WEAKNESS: Status: ACTIVE | Noted: 2024-01-01

## 2024-06-17 PROBLEM — H25.13 NUCLEAR SENILE CATARACT OF BOTH EYES: Status: ACTIVE | Noted: 2020-06-15

## 2024-06-17 PROBLEM — I42.9 CARDIOMYOPATHY (H): Status: ACTIVE | Noted: 2024-01-01

## 2024-06-17 PROBLEM — K59.00 CONSTIPATION: Status: ACTIVE | Noted: 2024-01-01

## 2024-06-17 PROBLEM — I77.810 MILD DILATION OF ASCENDING AORTA (H): Status: ACTIVE | Noted: 2017-03-13

## 2024-06-17 PROBLEM — E78.5 HYPERLIPIDEMIA: Status: ACTIVE | Noted: 2018-07-22

## 2024-06-17 PROBLEM — R79.89 ELEVATED TROPONIN LEVEL: Status: ACTIVE | Noted: 2024-01-01

## 2024-06-17 PROBLEM — R56.9 GENERALIZED SEIZURE (H): Status: ACTIVE | Noted: 2018-07-09

## 2024-06-17 PROBLEM — D50.9 MICROCYTIC ANEMIA: Status: ACTIVE | Noted: 2024-01-01

## 2024-06-17 PROBLEM — I21.4 NSTEMI (NON-ST ELEVATED MYOCARDIAL INFARCTION) (H): Status: ACTIVE | Noted: 2021-01-05

## 2024-06-17 PROBLEM — S01.512A TONGUE LACERATION: Status: ACTIVE | Noted: 2018-07-09

## 2024-06-17 NOTE — LETTER
6/17/2024      Iván Hernandez  [address]  [facility] [address]        [facility] GERIATR[facility]    PRIMARY CARE PROVIDER AND CLINIC:  [facility] / [facility] [address]  Chief Complaint   Patient presents with     Hospital F/U      [facility] Medical Record Number:  [id number]  Place of Service where encounter took place:  [facility] (TCU) [[id number]]    Iván Hernandez  is a 89 year old  (1/1/1930) male with a medical history of cerebellar stroke in 2020, seizure disorder and hypertension.  He currently lives at home with check and support from his son and daughter.  He was having progressive weakness and allover pain with cough therefore he presented to the [facility] emergency room on 6/11/2024.  He was found to have hyponatremia.  CT abd/pelvis showed RLL pneumonia, bilateral pleural effusions and focal pyelonephritis in the left kidney. He was started on azithromycin and ceftriaxone for pneumonia. EKG showed inferior wall ischemia and troponin was elevated. Echo showed new decrease in EF 35-40%, given IV lasix for mild CHF exacerbation. Cardiology felt the elevated troponin may be from acute coronary syndrome versus stress cardiomyopathy. Due to patient's age and comorbidities, it was felt that coronary angiogram was not urgently needed at this time, especially with no active chest pain.  He was admitted to the above facility from  [facility] . Hospital stay 6/11/2024 through 6/15/2024..     Iván was visited today while in his room resting in bed, son is present for the visit and able to interpret.  Son reports that his dad has been very anxious as of late as he feels that something is going to happen to him therefore he would like family members to stay overnight with him.  Last night he did have some episodes of shortness of breath and oxygen was applied.  Saturations have remained stable in the low to mid 90s.  He  does have a cough which has been present for quite some time.  He also has lower abdominal pain which she has had for some time as well.  Son reports that it has been a while since Iván has had a bowel movement.  He has not noted lower extremity edema.  He reports pain all over but cannot describe where it is.  His appetite is decreased but he does like chicken noodle soup which he ate well last night.  He has had urinary frequency for many years.  He is on tamsulosin but son reports that this has not been helpful and they would like to consider finasteride as this impairs his quality of life to frequently urinate.    CODE STATUS/ADVANCE DIRECTIVES DISCUSSION:  No Order  CPR/Full code   ALLERGIES:   Allergies   Allergen Reactions     Lisinopril Cough     Cough and headache      PAST MEDICAL HISTORY:   Past Medical History:   Diagnosis Date     Hypertension goal BP (blood pressure) < 140/90 10/28/2014      PAST SURGICAL HISTORY:   has no past surgical history on file.  FAMILY HISTORY: family history is not on file.  SOCIAL HISTORY:   reports that he has never smoked. He does not have any smokeless tobacco history on file. He reports that he does not drink alcohol and does not use drugs.  Patient's living condition: lives alone    Post Discharge Medication Reconciliation Status:   MED REC REQUIRED  Post Medication Reconciliation Status: discharge medications reconciled, continue medications without change       Current Outpatient Medications   Medication Sig Dispense Refill     acetaminophen (TYLENOL) 500 MG tablet Take 1,000 mg by mouth 3 times daily as needed for mild pain       aspirin 81 MG EC tablet 1 tablet Orally Once a day for 90 days       atorvastatin (LIPITOR) 20 MG tablet Take 20 mg by mouth daily       cefuroxime (CEFTIN) 500 MG tablet Take 500 mg by mouth 2 times daily       felodipine ER (PLENDIL) 5 MG 24 hr tablet Take 5 mg by mouth daily       finasteride (PROSCAR) 5 MG tablet Take 5 mg by mouth  daily       levETIRAcetam (KEPPRA) 250 MG tablet Take 500 mg by mouth 2 times daily       linaclotide (LINZESS) 145 MCG capsule Take 145 mcg by mouth every morning (before breakfast)       polyethylene glycol (MIRALAX) 17 g packet Take 17 g by mouth daily       sennosides (SENOKOT) 8.6 MG tablet Take 1 tablet by mouth 2 times daily as needed for constipation       tamsulosin (FLOMAX) 0.4 MG capsule Take 0.4 mg by mouth daily       No current facility-administered medications for this visit.       ROS:  10 point ROS of systems including Constitutional, Eyes, Respiratory, Cardiovascular, Gastroenterology, Genitourinary, Integumentary, Musculoskeletal, Psychiatric were all negative except for pertinent positives noted in my HPI.    Vitals:  /70   Pulse 99   Temp 98.2  F (36.8  C)   Resp 18   Wt 77.1 kg (170 lb)   SpO2 94%   BMI 23.06 kg/m    Exam:  GENERAL APPEARANCE:  Alert, in no distress, pleasant, cooperative, oriented x 4  EYES: no discharge or mattering on lids or lashes noted  ENT:  moist mucous membranes, hearing acuity intact  NECK: supple, symmetrical  RESP: no respiratory distress, Lung sounds clear but diminished in the right lower lobe, patient is on room air  CV:  rate and rhythm regular, no murmur. Edema none in bilateral lower extremities. VASCULAR: warm extremities without open areas.  ABDOMEN: normal bowel sounds, soft, nontender.  M/S:   Gait and station: Unsafe without assistance, no tenderness or swelling of the joints; able to move all extremities   SKIN:  Inspection and palpation of skin and subcutaneous tissue: skin warm, dry and intact without rashes  NEURO: no facial asymmetry, no speech deficits and able to follow directions, moves all extremities symmetrically  PSYCH:  insight and judgement intact, memory intact, affect and mood normal    Lab/Diagnostic data:  Recent labs in [facility] reviewed by me today.     ASSESSMENT/PLAN:  Community-acquired pneumonia.  Bilateral pleural  effusions.  Was treated with IV azithromycin and ceftriaxone while inpatient and changed to Ceftin for discharge.  His right lower lobe is diminished otherwise lungs are clear.  He has been afebrile.  Required oxygen therapy overnight and is currently on 2 L nasal cannula.  --Continue with Ceftin 500 mg twice daily with last dose 6/17/2024  -- Continue with oxygen therapy to keep saturations over 90%.  If becomes more hypoxic will obtain repeat chest x-ray.    NSTEMI  CHF  thought that it was demand ischemia from illness versus cardiac event.  Now with new heart failure with Echo showed EF 35-40%.  Was not discharged with the diuretic but he does not appear fluid overload on exam.  Systolic blood pressures have been in the 120s.  -- Continue to monitor fluid status and blood pressures.  -- Continue with felodipine 5 mg daily  -- Aspirin 81 mg daily    Pyelonephritis   Treated with Ceftin.  Has urinary frequency but this is likely due to BPH as it has been present for many years per the son's report.  --Continue with tamsulosin 0.4 mg daily  -- Start finasteride 5 mg daily in the morning    History of R cerebellar stroke   in 2020.residual left-sided weakness, ambulates with a walker and lives independently at home.  -- continue aspirin/atorvastatin.    Seizure disorder.  Diagnosed in 2018, on keppra.  --Continue with Keppra 500 mg twice daily    Physical deconditioning  Secondary to recent hospitalization and underlying medical conditions.   --ongoing PT/OT for strengthening.       Electronically signed by:  FAUZIA Jackson CNP                   Sincerely,        FAUZIA Jackson CNP

## 2024-06-17 NOTE — PROGRESS NOTES
[facility] GERIATRICS    PRIMARY CARE PROVIDER AND CLINIC:  [facility], [address] / [facility] [address]  Chief Complaint   Patient presents with    Hospital F/U      [facility] Medical Record Number:  [id number]  Place of Service where encounter took place:  [facility] [[id number]]    Iván Hernandez  is a 89 year old  (1/1/1930) male with a medical history of cerebellar stroke in 2020, seizure disorder and hypertension.  He currently lives at home with check and support from his son and daughter.  He was having progressive weakness and allover pain with cough therefore he presented to the [facility] emergency room on 6/11/2024.  He was found to have hyponatremia.  CT abd/pelvis showed RLL pneumonia, bilateral pleural effusions and focal pyelonephritis in the left kidney. He was started on azithromycin and ceftriaxone for pneumonia. EKG showed inferior wall ischemia and troponin was elevated. Echo showed new decrease in EF 35-40%, given IV lasix for mild CHF exacerbation. Cardiology felt the elevated troponin may be from acute coronary syndrome versus stress cardiomyopathy. Due to patient's age and comorbidities, it was felt that coronary angiogram was not urgently needed at this time, especially with no active chest pain.  He was admitted to the above facility from  [facility] . Hospital stay 6/11/2024 through 6/15/2024..     Iván was visited today while in his room resting in bed, son is present for the visit and able to interpret.  Son reports that his dad has been very anxious as of late as he feels that something is going to happen to him therefore he would like family members to stay overnight with him.  Last night he did have some episodes of shortness of breath and oxygen was applied.  Saturations have remained stable in the low to mid 90s.  He does have a cough which has been present for quite some time.  He also has lower  abdominal pain which she has had for some time as well.  Son reports that it has been a while since Iván has had a bowel movement.  He has not noted lower extremity edema.  He reports pain all over but cannot describe where it is.  His appetite is decreased but he does like chicken noodle soup which he ate well last night.  He has had urinary frequency for many years.  He is on tamsulosin but son reports that this has not been helpful and they would like to consider finasteride as this impairs his quality of life to frequently urinate.    CODE STATUS/ADVANCE DIRECTIVES DISCUSSION:  No Order  CPR/Full code   ALLERGIES:   Allergies   Allergen Reactions    Lisinopril Cough     Cough and headache      PAST MEDICAL HISTORY:   Past Medical History:   Diagnosis Date    Hypertension goal BP (blood pressure) < 140/90 10/28/2014      PAST SURGICAL HISTORY:   has no past surgical history on file.  FAMILY HISTORY: family history is not on file.  SOCIAL HISTORY:   reports that he has never smoked. He does not have any smokeless tobacco history on file. He reports that he does not drink alcohol and does not use drugs.  Patient's living condition: lives alone    Post Discharge Medication Reconciliation Status:   MED REC REQUIRED  Post Medication Reconciliation Status: discharge medications reconciled, continue medications without change       Current Outpatient Medications   Medication Sig Dispense Refill    acetaminophen (TYLENOL) 500 MG tablet Take 1,000 mg by mouth 3 times daily as needed for mild pain      aspirin 81 MG EC tablet 1 tablet Orally Once a day for 90 days      atorvastatin (LIPITOR) 20 MG tablet Take 20 mg by mouth daily      cefuroxime (CEFTIN) 500 MG tablet Take 500 mg by mouth 2 times daily      felodipine ER (PLENDIL) 5 MG 24 hr tablet Take 5 mg by mouth daily      finasteride (PROSCAR) 5 MG tablet Take 5 mg by mouth daily      levETIRAcetam (KEPPRA) 250 MG tablet Take 500 mg by mouth 2 times daily       linaclotide (LINZESS) 145 MCG capsule Take 145 mcg by mouth every morning (before breakfast)      polyethylene glycol (MIRALAX) 17 g packet Take 17 g by mouth daily      sennosides (SENOKOT) 8.6 MG tablet Take 1 tablet by mouth 2 times daily as needed for constipation      tamsulosin (FLOMAX) 0.4 MG capsule Take 0.4 mg by mouth daily       No current facility-administered medications for this visit.       ROS:  10 point ROS of systems including Constitutional, Eyes, Respiratory, Cardiovascular, Gastroenterology, Genitourinary, Integumentary, Musculoskeletal, Psychiatric were all negative except for pertinent positives noted in my HPI.    Vitals:  /70   Pulse 99   Temp 98.2  F (36.8  C)   Resp 18   Wt 77.1 kg (170 lb)   SpO2 94%   BMI 23.06 kg/m    Exam:  GENERAL APPEARANCE:  Alert, in no distress, pleasant, cooperative, oriented x 4  EYES: no discharge or mattering on lids or lashes noted  ENT:  moist mucous membranes, hearing acuity intact  NECK: supple, symmetrical  RESP: no respiratory distress, Lung sounds clear but diminished in the right lower lobe, patient is on room air  CV:  rate and rhythm regular, no murmur. Edema none in bilateral lower extremities. VASCULAR: warm extremities without open areas.  ABDOMEN: normal bowel sounds, soft, nontender.  M/S:   Gait and station: Unsafe without assistance, no tenderness or swelling of the joints; able to move all extremities   SKIN:  Inspection and palpation of skin and subcutaneous tissue: skin warm, dry and intact without rashes  NEURO: no facial asymmetry, no speech deficits and able to follow directions, moves all extremities symmetrically  PSYCH:  insight and judgement intact, memory intact, affect and mood normal    Lab/Diagnostic data:  Recent labs in [facility] reviewed by me today.     ASSESSMENT/PLAN:  Community-acquired pneumonia.  Bilateral pleural effusions.  Was treated with IV azithromycin and ceftriaxone while inpatient and changed to Ceftin  for discharge.  His right lower lobe is diminished otherwise lungs are clear.  He has been afebrile.  Required oxygen therapy overnight and is currently on 2 L nasal cannula.  --Continue with Ceftin 500 mg twice daily with last dose 6/17/2024  -- Continue with oxygen therapy to keep saturations over 90%.  If becomes more hypoxic will obtain repeat chest x-ray.    NSTEMI  CHF  thought that it was demand ischemia from illness versus cardiac event.  Now with new heart failure with Echo showed EF 35-40%.  Was not discharged with the diuretic but he does not appear fluid overload on exam.  Systolic blood pressures have been in the 120s.  -- Continue to monitor fluid status and blood pressures.  -- Continue with felodipine 5 mg daily  -- Aspirin 81 mg daily    Pyelonephritis   Treated with Ceftin.  Has urinary frequency but this is likely due to BPH as it has been present for many years per the son's report.  --Continue with tamsulosin 0.4 mg daily  -- Start finasteride 5 mg daily in the morning    History of R cerebellar stroke   in 2020.residual left-sided weakness, ambulates with a walker and lives independently at home.  -- continue aspirin/atorvastatin.    Seizure disorder.  Diagnosed in 2018, on keppra.  --Continue with Keppra 500 mg twice daily    Physical deconditioning  Secondary to recent hospitalization and underlying medical conditions.   --ongoing PT/OT for strengthening.       Electronically signed by:  FAUZIA Jackson CNP

## 2024-06-19 PROBLEM — I77.810 MILD DILATION OF ASCENDING AORTA (H): Status: RESOLVED | Noted: 2017-03-13 | Resolved: 2024-01-01
